# Patient Record
Sex: MALE | Race: ASIAN | URBAN - METROPOLITAN AREA
[De-identification: names, ages, dates, MRNs, and addresses within clinical notes are randomized per-mention and may not be internally consistent; named-entity substitution may affect disease eponyms.]

---
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
## 2018-03-28 ENCOUNTER — OFFICE VISIT (OUTPATIENT)
Dept: INTERNAL MEDICINE | Facility: CLINIC | Age: 18
End: 2018-03-28
Payer: COMMERCIAL

## 2018-03-28 VITALS
SYSTOLIC BLOOD PRESSURE: 116 MMHG | DIASTOLIC BLOOD PRESSURE: 81 MMHG | OXYGEN SATURATION: 100 % | BODY MASS INDEX: 30.68 KG/M2 | TEMPERATURE: 98.5 F | WEIGHT: 213.8 LBS | HEART RATE: 76 BPM

## 2018-03-28 DIAGNOSIS — R10.84 ABDOMINAL PAIN, GENERALIZED: ICD-10-CM

## 2018-03-28 DIAGNOSIS — R10.84 ABDOMINAL PAIN, GENERALIZED: Primary | ICD-10-CM

## 2018-03-28 LAB
ALBUMIN SERPL-MCNC: 4 G/DL (ref 3.4–5)
ALBUMIN UR-MCNC: NEGATIVE MG/DL
ALP SERPL-CCNC: 84 U/L (ref 40–150)
ALT SERPL W P-5'-P-CCNC: 57 U/L (ref 0–70)
ANION GAP SERPL CALCULATED.3IONS-SCNC: 6 MMOL/L (ref 3–14)
APPEARANCE UR: CLEAR
AST SERPL W P-5'-P-CCNC: 17 U/L (ref 0–45)
BASOPHILS # BLD AUTO: 0 10E9/L (ref 0–0.2)
BASOPHILS NFR BLD AUTO: 0.4 %
BILIRUB SERPL-MCNC: 0.3 MG/DL (ref 0.2–1.3)
BILIRUB UR QL STRIP: NEGATIVE
BUN SERPL-MCNC: 16 MG/DL (ref 7–30)
CALCIUM SERPL-MCNC: 9.1 MG/DL (ref 8.5–10.1)
CHLORIDE SERPL-SCNC: 105 MMOL/L (ref 94–109)
CO2 SERPL-SCNC: 27 MMOL/L (ref 20–32)
COLOR UR AUTO: YELLOW
CREAT SERPL-MCNC: 0.82 MG/DL (ref 0.66–1.25)
DIFFERENTIAL METHOD BLD: NORMAL
EOSINOPHIL # BLD AUTO: 0.3 10E9/L (ref 0–0.7)
EOSINOPHIL NFR BLD AUTO: 3.2 %
ERYTHROCYTE [DISTWIDTH] IN BLOOD BY AUTOMATED COUNT: 13.2 % (ref 10–15)
GFR SERPL CREATININE-BSD FRML MDRD: >90 ML/MIN/1.7M2
GLUCOSE SERPL-MCNC: 89 MG/DL (ref 70–99)
GLUCOSE UR STRIP-MCNC: NEGATIVE MG/DL
HCT VFR BLD AUTO: 44.6 % (ref 40–53)
HGB BLD-MCNC: 14.6 G/DL (ref 13.3–17.7)
HGB UR QL STRIP: NEGATIVE
IMM GRANULOCYTES # BLD: 0 10E9/L (ref 0–0.4)
IMM GRANULOCYTES NFR BLD: 0.3 %
KETONES UR STRIP-MCNC: NEGATIVE MG/DL
LEUKOCYTE ESTERASE UR QL STRIP: NEGATIVE
LYMPHOCYTES # BLD AUTO: 2.4 10E9/L (ref 0.8–5.3)
LYMPHOCYTES NFR BLD AUTO: 30.7 %
MCH RBC QN AUTO: 28.3 PG (ref 26.5–33)
MCHC RBC AUTO-ENTMCNC: 32.7 G/DL (ref 31.5–36.5)
MCV RBC AUTO: 87 FL (ref 78–100)
MONOCYTES # BLD AUTO: 0.4 10E9/L (ref 0–1.3)
MONOCYTES NFR BLD AUTO: 5.1 %
MUCOUS THREADS #/AREA URNS LPF: PRESENT /LPF
NEUTROPHILS # BLD AUTO: 4.7 10E9/L (ref 1.6–8.3)
NEUTROPHILS NFR BLD AUTO: 60.3 %
NITRATE UR QL: NEGATIVE
NRBC # BLD AUTO: 0 10*3/UL
NRBC BLD AUTO-RTO: 0 /100
PH UR STRIP: 6 PH (ref 5–7)
PLATELET # BLD AUTO: 253 10E9/L (ref 150–450)
POTASSIUM SERPL-SCNC: 3.8 MMOL/L (ref 3.4–5.3)
PROT SERPL-MCNC: 7.7 G/DL (ref 6.8–8.8)
RBC # BLD AUTO: 5.15 10E12/L (ref 4.4–5.9)
RBC #/AREA URNS AUTO: <1 /HPF (ref 0–2)
SODIUM SERPL-SCNC: 138 MMOL/L (ref 133–144)
SOURCE: ABNORMAL
SP GR UR STRIP: 1.02 (ref 1–1.03)
SQUAMOUS #/AREA URNS AUTO: <1 /HPF (ref 0–1)
UROBILINOGEN UR STRIP-MCNC: 0 MG/DL (ref 0–2)
WBC # BLD AUTO: 7.8 10E9/L (ref 4–11)
WBC #/AREA URNS AUTO: 0 /HPF (ref 0–5)

## 2018-03-28 ASSESSMENT — PAIN SCALES - GENERAL: PAINLEVEL: MODERATE PAIN (4)

## 2018-03-28 NOTE — PROGRESS NOTES
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 It is about at waist level.  It has been present for 3 weeks.  It is present all the time and is the same at all times.  Eating sometimes make it slightly worse.  He has no fever or chills.  He has no nausea, vomiting, diarrhea or constipation. He has no change in bowel habits. He has no blood in his urine.  He does not smoke or drink any alcohol.      He has no history of past surgery.  He is not under any unusual stress.  He has asthma and uses two inhalers.  His weight has actually gone up over the last year.  The pain has not disrupted any class attendance.  He is not sexually active.     ROS:  Constitutional: no fevers, night sweats or unintentional weight change   Eyes: no vision change, diplopia or red eyes   Ears, Nose, Mouth, Throat: no tinnitus or hearing change, no oral lesions, throat clear   Cardiovascular: no chest pain, palpitations,orthopnea or PND   Respiratory: no dyspnea, cough, shortness of breath or wheezing   GI: see HPI  : no change in urine, no dysuria or hematuria   Musculoskeletal: no joint or muscle pain or swelling   Psych: no depression or anxiety, no sleep problems    Patient Active Problem List   Diagnosis     Asthma     Exophoria     Regular astigmatism     Myopia     Past Medical History:   Diagnosis Date     Exophoria      Unspecified asthma(493.90) 3/30/2012     No past surgical history on file.  Social History   Substance Use Topics     Smoking status: Never Smoker     Smokeless tobacco: Never Used     Alcohol use No     Family History   Problem Relation Age of Onset     DIABETES Father      Allergies   Allergen Reactions     Food      Macadamia Nuts  Hazelnut  Poppyseed     Current Outpatient Prescriptions   Medication Sig Dispense Refill     beclomethasone (QVAR) 40 MCG/ACT Inhaler Inhale  2 puffs into the lungs 2 times daily 3 Inhaler 3     albuterol (VENTOLIN HFA) 108 (90 BASE) MCG/ACT Inhaler Inhale 2 puffs into the lungs every 6 hours as needed for shortness of breath / dyspnea 3 Inhaler 3     /81  Pulse 76  Temp 98.5  F (36.9  C) (Oral)  Wt 97 kg (213 lb 12.8 oz)  SpO2 100%  BMI 30.68 kg/m2    PHYSICAL EXAM:  Constitutional: no distress, comfortable, pleasant, overweight   Eyes: anicteric, normal extra-ocular movements   Ears, Nose and Throat: throat clear, neck supple with full range of motion, no thyromegaly.   Cardiovascular: regular rate and rhythm, normal S1 and S2, no murmurs, rubs or gallops  Respiratory: clear to auscultation, no wheezes or crackles, normal breath sounds   Gastrointestinal: positive bowel sounds, nontender, no hepatosplenomegaly.  He points out a lump on his back to the left of his sacrum but this feels like fatty tissue to me.   Musculoskeletal: full range of motion, no edema   Psychological: appropriate mood   Lymphatic: no cervical lymphadenopathy    A/P:    Nonspecific abdominal pain x3 weeks, left greater than right.  The physical exam is normal and there are no red flags in his history.  I have ordered a CBC, comp panel and urinalysis.  At this time, if those are normal, I would be inclined to wait before getting an imaging study based on his benign history.  He has visited the ED in the past for headaches and for chest pain, so I asked about stress or anxiety, but he denied those symptoms.  He did say that he had looked online and was wondering whether there was something wrong with his colon.  We discussed that in the absence of diarrhea or blood in his stool, given his age, this was very unlikely but we would wait and see what the labs show.  I will follow up with him by telephone and probably see him in followup in about 1 month if the labs are normal.     Total time spent 25 minutes.  More than 50% of the time spent with Mr. Dolan on counseling /  coordinating his care.

## 2018-03-28 NOTE — MR AVS SNAPSHOT
After Visit Summary   3/28/2018    Jonathan Dolan    MRN: [id number]           Patient Information     Date Of Birth          2000        Visit Information        Provider Department      3/28/2018 3:55 PM Francie Galloway MD [facility]        Today's Diagnoses     Abdominal pain, generalized    -  1       Follow-ups after your visit        Who to contact     Please call your clinic at [phone] to:    Ask questions about your health    Make or cancel appointments    Discuss your medicines    Learn about your test results    Speak to your doctor            Additional Information About Your Visit        MyChart Information     [employer] is an electronic gateway that provides easy, online access to your medical records. With [employer], you can request a clinic appointment, read your test results, renew a prescription or communicate with your care team.     To sign up for [employer]row visit the website at [email or web address]   You will be asked to enter the access code listed below, as well as some personal information. Please follow the directions to create your username and password.     Your access code is: [email or web address]  Expires: 2018  6:30 AM     Your access code will  in 90 days. If you need help or a new code, please contact your [facility] or call [phone] for assistance.        Care EveryWhere ID     This is your Care EveryWhere ID. This could be used by other organizations to access your [facility] medical records  [id number]        Your Vitals Were     Pulse Temperature Pulse Oximetry BMI (Body Mass Index)          76 98.5  F (36.9  C) (Oral) 100% 30.68 kg/m2         Blood Pressure from Last 3 Encounters:   18 116/81   17 133/85   17 125/85    Weight from Last 3 Encounters:   18 97 kg (213 lb 12.8 oz)   17 91.9 kg (202 lb 9.6 oz) (93 %)*   17 92.8 kg (204 lb 9.6 oz) (94 %)*     * Growth  percentiles are based on [facility] 2-20 Years data.               Primary Care Provider Office Phone # Fax #    Ulises Richter [phone]       [address] [place] [id number]        Equal Access to Services     JULIANO PEDRAZA : Hadii chelita ku hadkirsteno Soomaali, waaxda luqadaha, qaybta kaalmada adeegyada, alondra shipmann petraandrez liriano scotty fritz. So [facility] [phone].    ATENCIÓN: Si habla español, tiene a alba disposición servicios gratuitos de asistencia lingüística. [facility] [phone].    We comply with applicable federal civil rights laws and [place] laws. We do not discriminate on the basis of race, color, national origin, age, disability, sex, sexual orientation, or gender identity.            Thank you!     Thank you for choosing [facility]  for your care. Our goal is always to provide you with excellent care. Hearing back from our patients is one way we can continue to improve our services. Please take a few minutes to complete the written survey that you may receive in the mail after your visit with us. Thank you!             Your Updated Medication List - Protect others around you: Learn how to safely use, store and throw away your medicines at www.disposemymeds.org.          This list is accurate as of 3/28/18 11:59 PM.  Always use your most recent med list.                   Brand Name Dispense Instructions for use Diagnosis    albuterol 108 (90 BASE) MCG/ACT Inhaler    VENTOLIN HFA    3 Inhaler    Inhale 2 puffs into the lungs every 6 hours as needed for shortness of breath / dyspnea    Intermittent asthma, uncomplicated       beclomethasone 40 MCG/ACT Inhaler    QVAR    3 Inhaler    Inhale 2 puffs into the lungs 2 times daily    Mild persistent asthma without complication

## 2018-03-28 NOTE — NURSING NOTE
Chief Complaint   Patient presents with     Abdominal Pain     Left abdominal and side pain for past few weeks, right side started a few days ago, denies N&V.      Chest Pain     For past few years has been evaluated in past at ED and was discussed at a previous appointment about 6 months ago.

## 2018-08-13 ENCOUNTER — OFFICE VISIT (OUTPATIENT)
Dept: INTERNAL MEDICINE | Facility: CLINIC | Age: 18
End: 2018-08-13
Payer: COMMERCIAL

## 2018-08-13 VITALS
DIASTOLIC BLOOD PRESSURE: 78 MMHG | SYSTOLIC BLOOD PRESSURE: 118 MMHG | RESPIRATION RATE: 16 BRPM | HEIGHT: 69 IN | HEART RATE: 71 BPM | WEIGHT: 216.4 LBS | BODY MASS INDEX: 32.05 KG/M2 | OXYGEN SATURATION: 99 % | TEMPERATURE: 98 F

## 2018-08-13 DIAGNOSIS — Z13.220 LIPID SCREENING: ICD-10-CM

## 2018-08-13 DIAGNOSIS — G43.009 MIGRAINE WITHOUT AURA AND WITHOUT STATUS MIGRAINOSUS, NOT INTRACTABLE: ICD-10-CM

## 2018-08-13 DIAGNOSIS — G44.219 EPISODIC TENSION-TYPE HEADACHE, NOT INTRACTABLE: Primary | ICD-10-CM

## 2018-08-13 LAB
CHOLEST SERPL-MCNC: 226 MG/DL
HDLC SERPL-MCNC: 52 MG/DL
LDLC SERPL CALC-MCNC: 155 MG/DL
NONHDLC SERPL-MCNC: 174 MG/DL
TRIGL SERPL-MCNC: 92 MG/DL

## 2018-08-13 RX ORDER — SUMATRIPTAN 25 MG/1
25 TABLET, FILM COATED ORAL
Qty: 5 TABLET | Refills: 1 | Status: SHIPPED | OUTPATIENT
Start: 2018-08-13

## 2018-08-13 RX ORDER — CYCLOBENZAPRINE HCL 10 MG
10 TABLET ORAL
Qty: 5 TABLET | Refills: 1 | Status: SHIPPED | OUTPATIENT
Start: 2018-08-13

## 2018-08-13 ASSESSMENT — PAIN SCALES - GENERAL: PAINLEVEL: MILD PAIN (2)

## 2018-08-13 NOTE — PATIENT INSTRUCTIONS
[facility] Medication Refill Request Information:  * Please contact your pharmacy regarding ANY request for medication refills.  ** [facility] Prescription Fax = [phone]  * Please allow 3 business days for routine medication refills.  * Please allow 5 business days for controlled substance medication refills.     [facility] Test Result notification information:  *You will be notified with in 7-10 days of your appointment day regarding the results of your test.  If you are on MyChart you will be notified as soon as the provider has reviewed the results and signed off on them.    [facility]: [phone]

## 2018-08-13 NOTE — PROGRESS NOTES
Internal Medicine  Clinic Progress Note  August 13, 2018         Assessment and Plan:      Jonathan is a 18 year old previously healthy male presenting today for evaluation of 1 year of headaches and a general physical exam.    1. Encounter for routine annual exam with no abnormalities. Jonathan is a healthy 18 year old male. He declined HPV and TDAP vaccines today.  - fasting lipid and cholesterol panel    2. Headaches- The most likely etiology for the headaches is either a Migraine headache or Tension headache. This is consistent with the unilateral sharp shooting pain over the left eye area that takes hours to resolve. However, for migraines we would have expected to see some light sensitivity, aura or nausea/vomiting. For tension headaches we would have expected to see pain to palpation over the problem areas and for the pain to radiate throughout the left side of the head. Neither of these diagnoses explain the constant baseline headache he is experiencing. It is reassuring that the CTA he had 9/2017 was negative for any abnormalities; therefore cancer or a structural abnormality is unlikely. It is possible the baseline headache is from eye strain since his last eye appointment was in 2015. Once the type of headache is differentiated than prophylactic medication can be discussed.     - Cyclobenzaprine 10 mg at start of sharp headache. If this relieves the pain the headaches are likely tension in origin.  - Sumatriptan 25mg at the start of a sharp headache. If this relieves the pain the headaches are likely tension in origin.   - Instructed Rhaul to discuss headaches with ophthalmologist tomorrow.     Discussed with staff, Dr. Rober Hebert  MS3  [facility]    I, Ulises Richter, was present with the medical student who participated in the service  and in the documentation of the note.  I have verified the history and personally performed the physical exam and medical decision making.  I agree  with the assessment and plan of care as documented in the note.                  Chief Complaint:   Annual physical exam and headaches for 1 year.          History of Present Illness:   Jonathan is a 18 year old previously healthy male presenting today for evaluation of 1 year of headaches and a general physical exam. The headaches started one year ago and have been getting progressively worse since March. He is experiencing sharp shooting pain 1-2 times per week randomly and has a dull headache at baseline. He rates the sharp pain a 6-7/10 and the baseline headache 1-2/10. The headache localizes over the left eye and left occipital area. It does not radiate and never crosses to the right side. The stabbing headaches last 1-3 hrs at a time. The pain does not wake him up during the night. He has tried Tylenol and Excedrin with no relief. Nothing seems to make the headache worse. There is no light sensitivity, aura, nausea or vomiting associated with the headaches. He had a negative CTA 9/2017. There is no family history of headaches. He has glasses and hasn't seen an eye doctor since 2015. He has an appointment with an opthamologist tomorrow. He has noticed that it seems to be taking more effort to making his eyes focus.    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            Past Medical History:     Past Medical History:   Diagnosis Date     Exophoria      Unspecified asthma(493.90) 3/30/2012             Past Surgical History:   No past surgical history on file.          Social History:     Social History   Substance Use Topics     Smoking status: Never Smoker     Smokeless tobacco: Never Used     Alcohol use No   Not currently sexually active.          Family History:     Family History   Problem Relation Age of Onset     Diabetes Father                Allergies:     Allergies   Allergen Reactions     No Clinical Screening - See Comments Shortness Of Breath     Food       "Other reaction(s): Vomiting, Wheezing  Poppy seeds  edith nuts, walnuts    gets cough, rash, asthma  Macadamia Nuts  Hazelnut  Poppyseed             Medications:     Current Outpatient Prescriptions   Medication Sig     albuterol (VENTOLIN HFA) 108 (90 BASE) MCG/ACT Inhaler Inhale 2 puffs into the lungs every 6 hours as needed for shortness of breath / dyspnea     beclomethasone (QVAR) 40 MCG/ACT Inhaler Inhale 2 puffs into the lungs 2 times daily     cyclobenzaprine (FLEXERIL) 10 MG tablet Take 1 tablet (10 mg) by mouth once as needed for other (headache)     SUMAtriptan (IMITREX) 25 MG tablet Take 1 tablet (25 mg) by mouth at onset of headache for migraine     No current facility-administered medications for this visit.                Review of Systems:   10-point ROS otherwise negative except as noted above.          Physical Exam:   All vitals have been reviewed    /78  Pulse 71  Temp 98  F (36.7  C) (Oral)  Resp 16  Ht 1.765 m (5' 9.49\")  Wt 98.2 kg (216 lb 6.4 oz)  SpO2 99%  BMI 31.51 kg/m2      Physical Exam:  Gen: alert and awake, no acute distress  HEENT: no pain to palpation over occipital area or forehead, neck supple, small 1 mm mobile mass preauricular in skin, no thyromegaly, EOM intact, NEIDA, throat pink and moist with no exudate, tympanic membranes clear  CV: RRR, normal S1 and S2 with no murmurs or gallops  Resp: clear to ascultation all lung fields bilaterally  Abd: bowel sounds present, soft, non-distended, non-tender to light and deep palpation  Neuro: CN II-XII intact, patellar reflex and ankle jerk reflex +2 bilaterally, normal sensation and strength upper and lower extremity, normal gait, finger to nose and rapid alternating movements normal  Skin: no concerning moles or lesions.  Psychologic: appropriate mood  Lymph: no cervical or supraclavicular lymphadenopathy           Data:   All laboratory data reviewed    Lab: fasting lipid panel pending      Imaging: none.          "

## 2018-08-13 NOTE — NURSING NOTE
Chief Complaint   Patient presents with     Physical     Pt is here for annual physical.      Headache     Pt is here to discuss headaches. Duration couple months.      Lilliam Ibrahim LPN at 8:03 AM on 8/13/2018.

## 2018-08-13 NOTE — MR AVS SNAPSHOT
After Visit Summary   8/13/2018    Jonathan Dolan    MRN: [id number]           Patient Information     Date Of Birth          2000        Visit Information        Provider Department      8/13/2018 8:30 AM Ulises Richter MD [facility]        Today's Diagnoses     Episodic tension-type headache, not intractable    -  1    Migraine without aura and without status migrainosus, not intractable        Lipid screening          Care Instructions    Primary Care Center Medication Refill Request Information:  * Please contact your pharmacy regarding ANY request for medication refills.  ** [facility] Prescription Fax = [phone]  * Please allow 3 business days for routine medication refills.  * Please allow 5 business days for controlled substance medication refills.     Primary Care Center Test Result notification information:  *You will be notified with in 7-10 days of your appointment day regarding the results of your test.  If you are on MyChart you will be notified as soon as the provider has reviewed the results and signed off on them.    Primary Care Center: [phone]           Follow-ups after your visit        Your next 10 appointments already scheduled     Aug 14, 2018  1:30 PM CDT   NEW GENERAL with Bryant Jackson MD   Eye Clinic ([facility])    [address]  [facility] [id number] [phone]              Future tests that were ordered for you today     Open Future Orders        Priority Expected Expires Ordered    Lipid panel reflex to direct LDL Fasting Routine 8/13/2018 8/13/2019 8/13/2018            Who to contact     Please call your clinic at [phone] to:    Ask questions about your health    Make or cancel appointments    Discuss your medicines    Learn about your test results    Speak to your doctor            Additional Information About Your Visit        MyChart Information     MyChart is an  "electronic gateway that provides easy, online access to your medical records. With [employer], you can request a clinic appointment, read your test results, renew a prescription or communicate with your care team.     To sign up for [employer] visit the website at [email or web address]   You will be asked to enter the access code listed below, as well as some personal information. Please follow the directions to create your username and password.     Your access code is: [email or web address]  Expires: 10/28/2018  6:31 AM     Your access code will  in 90 days. If you need help or a new code, please contact your [facility] or call [phone] for assistance.        Care EveryWhere ID     This is your Care EveryWhere ID. This could be used by other organizations to access your [facility] medical records  [id number]        Your Vitals Were     Pulse Temperature Respirations Height Pulse Oximetry BMI (Body Mass Index)    71 98  F (36.7  C) (Oral) 16 1.765 m (5' 9.49\") 99% 31.51 kg/m2       Blood Pressure from Last 3 Encounters:   18 118/78   18 116/81   17 133/85    Weight from Last 3 Encounters:   18 98.2 kg (216 lb 6.4 oz)   18 97 kg (213 lb 12.8 oz)   17 91.9 kg (202 lb 9.6 oz) (93 %)*     * Growth percentiles are based on CDC 2-20 Years data.                 Today's Medication Changes          These changes are accurate as of 18  9:39 AM.  If you have any questions, ask your nurse or doctor.               Start taking these medicines.        Dose/Directions    cyclobenzaprine 10 MG tablet   Commonly known as:  FLEXERIL   Used for:  Episodic tension-type headache, not intractable   Started by:  Ulises Richter MD        Dose:  10 mg   Take 1 tablet (10 mg) by mouth once as needed for other (headache)   Quantity:  5 tablet   Refills:  1       SUMAtriptan 25 MG tablet   Commonly known as:  IMITREX   Used for:  Migraine without aura and without status " migrainosus, not intractable   Started by:  Ulises Richter MD        Dose:  25 mg   Take 1 tablet (25 mg) by mouth at onset of headache for migraine   Quantity:  5 tablet   Refills:  1            Where to get your medicines      These medications were sent to [facility] [place], [place] - [address]  [address], [facility] [address]    Hours:  TRANSPLANT PHONE NUMBER [phone] Phone:  [phone]     cyclobenzaprine 10 MG tablet    SUMAtriptan 25 MG tablet                Primary Care Provider Office Phone # Fax #    Ulises Richter [phone]       [address] [place] [address] [address] [facility] [address]  [facility] [address]        Equal Access to Services     JULIANO PEDRAZA : Hadii chelita terry hadasho Soomaali, waaxda luqadaha, qaybta kaalmada adeegyada, waxay carinain hayradha fritz. So [facility] [phone].    ATENCIÓN: Si habla español, tiene a alba disposición servicios gratuitos de asistencia lingüística. [facility] [phone].    We comply with applicable federal civil rights laws and [place] laws. We do not discriminate on the basis of race, color, national origin, age, disability, sex, sexual orientation, or gender identity.            Thank you!     Thank you for choosing [facility]  for your care. Our goal is always to provide you with excellent care. Hearing back from our patients is one way we can continue to improve our services. Please take a few minutes to complete the written survey that you may receive in the mail after your visit with us. Thank you!             Your Updated Medication List - Protect others around you: Learn how to safely use, store and throw away your medicines at www.disposemymeds.org.          This list is accurate as of 8/13/18  9:39 AM.  Always use your most recent med list.                   Brand Name Dispense Instructions for use Diagnosis    albuterol 108 (90 Base) MCG/ACT inhaler    VENTOLIN HFA    3  Inhaler    Inhale 2 puffs into the lungs every 6 hours as needed for shortness of breath / dyspnea    Intermittent asthma, uncomplicated       beclomethasone 40 MCG/ACT Inhaler    QVAR    3 Inhaler    Inhale 2 puffs into the lungs 2 times daily    Mild persistent asthma without complication       cyclobenzaprine 10 MG tablet    FLEXERIL    5 tablet    Take 1 tablet (10 mg) by mouth once as needed for other (headache)    Episodic tension-type headache, not intractable       SUMAtriptan 25 MG tablet    IMITREX    5 tablet    Take 1 tablet (25 mg) by mouth at onset of headache for migraine    Migraine without aura and without status migrainosus, not intractable

## 2018-08-14 ENCOUNTER — OFFICE VISIT (OUTPATIENT)
Dept: OPHTHALMOLOGY | Facility: CLINIC | Age: 18
End: 2018-08-14
Attending: OPHTHALMOLOGY
Payer: COMMERCIAL

## 2018-08-14 DIAGNOSIS — R51.9 NONINTRACTABLE EPISODIC HEADACHE, UNSPECIFIED HEADACHE TYPE: ICD-10-CM

## 2018-08-14 DIAGNOSIS — H52.223 REGULAR ASTIGMATISM OF BOTH EYES: Primary | ICD-10-CM

## 2018-08-14 DIAGNOSIS — H52.13 MYOPIA OF BOTH EYES: ICD-10-CM

## 2018-08-14 PROCEDURE — G0463 HOSPITAL OUTPT CLINIC VISIT: HCPCS | Mod: ZF

## 2018-08-14 PROCEDURE — 92015 DETERMINE REFRACTIVE STATE: CPT | Mod: ZF

## 2018-08-14 ASSESSMENT — VISUAL ACUITY
OS_CC: J1
CORRECTION_TYPE: GLASSES
OD_CC: J1
METHOD: SNELLEN - LINEAR
OD_CC: 20/20
OS_CC: 20/20
OD_CC+: -3

## 2018-08-14 ASSESSMENT — REFRACTION_MANIFEST
OD_CYLINDER: +4.25
OD_AXIS: 092
OS_AXIS: 088
OS_CYLINDER: +4.25
OD_SPHERE: -4.75
OS_SPHERE: -5.00

## 2018-08-14 ASSESSMENT — REFRACTION_WEARINGRX
OD_CYLINDER: +4.25
OD_AXIS: 092
OD_SPHERE: -4.50
OS_CYLINDER: +4.00
OS_SPHERE: -4.50
OS_AXIS: 088

## 2018-08-14 ASSESSMENT — SLIT LAMP EXAM - LIDS
COMMENTS: NORMAL
COMMENTS: NORMAL

## 2018-08-14 ASSESSMENT — TONOMETRY
OD_IOP_MMHG: 22
OS_IOP_MMHG: 23
IOP_METHOD: TONOPEN
IOP_METHOD: TONOPEN
OS_IOP_MMHG: 19
OD_IOP_MMHG: 21

## 2018-08-14 ASSESSMENT — CONF VISUAL FIELD
OD_NORMAL: 1
OS_NORMAL: 1

## 2018-08-14 ASSESSMENT — EXTERNAL EXAM - RIGHT EYE: OD_EXAM: NORMAL

## 2018-08-14 ASSESSMENT — CUP TO DISC RATIO
OD_RATIO: 0.2
OS_RATIO: 0.35

## 2018-08-14 ASSESSMENT — EXTERNAL EXAM - LEFT EYE: OS_EXAM: NORMAL

## 2018-08-14 NOTE — NURSING NOTE
Chief Complaints and History of Present Illnesses   Patient presents with     Annual Eye Exam     HPI    Affected eye(s):  Both   Symptoms:     No blurred vision   No decreased vision   No floaters   No flashes      Duration:  3 years      Do you have eye pain now?:  No      Comments:  Annual exam.    The patient notes he has had a headache above the left eye and  Pain in a spot in the back of his head.  GEORGE Lopes 1:30 PM 08/14/2018

## 2018-08-14 NOTE — PROGRESS NOTES
Jonathan is a 18 year old male with h/o exophoria who presents for an annual eye evaluation. The patient states he has not noticed a change in vision over the past 3 years, but has been having headaches. Feels headaches just above left eye and in back of head. Occur 1-2x/week for the past several months. Denies visual changes w/ headaches, N/V, diplopia, flashes, floaters. There is mild pain in head constantly, and the weekly episodes have been new over past 3 months. Episodes last 1-3 hrs. These episodes have not been getting worse.     Over the past 3 months, has had more difficulty keeping eyes focused.     Tylenol does not help. Was prescribed migraine and tension headache medication yesterday by PCP but has not used them yet.     Assessment & Plan      Jonathan Dolan is a 18 year old male with the following diagnoses:   1. Regular astigmatism of both eyes    2. Myopia of both eyes    3. Nonintractable episodic headache, unspecified headache type         Stable dilated fundus exam in both eyes   Not ocular etiology or sequale related to HA   Recommend to start medication per primary care physician and establish care in [place], [place] when he is settled next month    Refractive options reviewed  Refraction given        Patient disposition:   Return in about 2 years (around 8/14/2020).       Alfred Polo M.D.  PGY-2  Ophthalmology    Attending Physician Attestation:  Complete documentation of historical and exam elements from today's encounter can be found in the full encounter summary report (not reduplicated in this progress note).  I personally obtained the chief complaint(s) and history of present illness.  I confirmed and edited as necessary the review of systems, past medical/surgical history, family history, social history, and examination findings as documented by others; and I examined the patient myself.  I personally reviewed the relevant tests, images, and reports as documented above.  I formulated and  edited as necessary the assessment and plan and discussed the findings and management plan with the patient and family. . - Bryant Jackson MD

## 2018-08-14 NOTE — MR AVS SNAPSHOT
After Visit Summary   2018    Jonathan Dolan    MRN: [id number]           Patient Information     Date Of Birth          2000        Visit Information        Provider Department      2018 1:30 PM Bryant Jackson MD [facility]        Today's Diagnoses     Regular astigmatism of both eyes    -  1    Myopia of both eyes        Nonintractable episodic headache, unspecified headache type           Follow-ups after your visit        Follow-up notes from your care team     Return in about 2 years (around 2020).      Who to contact     Please call your clinic at [phone] to:    Ask questions about your health    Make or cancel appointments    Discuss your medicines    Learn about your test results    Speak to your doctor            Additional Information About Your Visit        MyChart Information     [employer] is an electronic gateway that provides easy, online access to your medical records. With [employer], you can request a clinic appointment, read your test results, renew a prescription or communicate with your care team.     To sign up for [employer] visit the website at [email or web address]   You will be asked to enter the access code listed below, as well as some personal information. Please follow the directions to create your username and password.     Your access code is: [email or web address]  Expires: 10/28/2018  6:31 AM     Your access code will  in 90 days. If you need help or a new code, please contact your [facility] or call [phone] for assistance.        Care EveryWhere ID     This is your Care EveryWhere ID. This could be used by other organizations to access your [facility] medical records  [id number]         Blood Pressure from Last 3 Encounters:   18 118/78   18 116/81   17 133/85    Weight from Last 3 Encounters:   18 98.2 kg (216 lb 6.4 oz)   18 97 kg (213 lb 12.8 oz)   17 91.9 kg (202 lb 9.6  oz) (93 %)*     * Growth percentiles are based on [facility] 2-20 Years data.              Today, you had the following     No orders found for display       Primary Care Provider Office Phone # Fax #    Ulises Richter [phone]       [address] [place] [address] [address]  [facility] [address]        Equal Access to Services     JULIANO PEDRAZA : Hadii aad ku hadasho Soomaali, waaxda luqadaha, qaybta kaalmada adeegyada, waxay idiin hayaan adeeg kharash laRandyaan . So [facility] [phone].    ATENCIÓN: Si habla español, tiene a alba disposición servicios gratuitos de asistencia lingüística. Llame al [phone].    We comply with applicable federal civil rights laws and [place] laws. We do not discriminate on the basis of race, color, national origin, age, disability, sex, sexual orientation, or gender identity.            Thank you!     Thank you for choosing EYE CLINIC  for your care. Our goal is always to provide you with excellent care. Hearing back from our patients is one way we can continue to improve our services. Please take a few minutes to complete the written survey that you may receive in the mail after your visit with us. Thank you!             Your Updated Medication List - Protect others around you: Learn how to safely use, store and throw away your medicines at www.disposemymeds.org.          This list is accurate as of 8/14/18  5:12 PM.  Always use your most recent med list.                   Brand Name Dispense Instructions for use Diagnosis    albuterol 108 (90 Base) MCG/ACT inhaler    VENTOLIN HFA    3 Inhaler    Inhale 2 puffs into the lungs every 6 hours as needed for shortness of breath / dyspnea    Intermittent asthma, uncomplicated       beclomethasone 40 MCG/ACT Inhaler    QVAR    3 Inhaler    Inhale 2 puffs into the lungs 2 times daily    Mild persistent asthma without complication       cyclobenzaprine 10 MG tablet    FLEXERIL    5 tablet    Take 1 tablet (10 mg) by mouth once as needed for other  (headache)    Episodic tension-type headache, not intractable       SUMAtriptan 25 MG tablet    IMITREX    5 tablet    Take 1 tablet (25 mg) by mouth at onset of headache for migraine    Migraine without aura and without status migrainosus, not intractable

## 2018-12-10 DIAGNOSIS — J45.30 MILD PERSISTENT ASTHMA WITHOUT COMPLICATION: ICD-10-CM

## 2018-12-10 DIAGNOSIS — J45.20 INTERMITTENT ASTHMA, UNCOMPLICATED: ICD-10-CM

## 2018-12-10 NOTE — LETTER
Dear Jonathan Dolan,      At  [facility] we care about your health and are committed to providing quality patient care. It has come to our attention that you are due for an Asthma Control Test.     This screening tool helps us to assess how well your asthma is controlled. Good asthma control leads to less asthma symptoms and greater health. If your asthma is not in good control (score less than 20) it is recommended you be seen by your provider for medication and lifestyle adjustments.    Please click on the link below to access this brief questionnaire. This is valuable information that will be requested by your Care Team.    Ages 12 years and older    Please answer the questions in the link below, and respond below with your answers to each of the questions.    [email or web address]/asthma/education/handouts/[email or web address]        Question #1   Question #2   Question #3   Question #4   Question #5   TOTAL   # of Emergency Room visits related to asthma in the last 12 months   # of hospitalizations related  to asthma in the last 12 months         Thank you for your time.      Sincerely,    Primary Care Clinic

## 2018-12-11 RX ORDER — FLUTICASONE PROPIONATE 110 UG/1
1 AEROSOL, METERED RESPIRATORY (INHALATION) 2 TIMES DAILY
Qty: 1 INHALER | Refills: 11 | OUTPATIENT
Start: 2018-12-11

## 2018-12-11 NOTE — TELEPHONE ENCOUNTER
Last Clinic Visit: 8/13/2018  [facility]    *Test(s) past due-ACT.  Radha refill 90 days (1 inhaler + 2 refills) and FYI to [facility] clinic CMA.